# Patient Record
Sex: MALE | Race: WHITE | URBAN - METROPOLITAN AREA
[De-identification: names, ages, dates, MRNs, and addresses within clinical notes are randomized per-mention and may not be internally consistent; named-entity substitution may affect disease eponyms.]

---

## 2022-10-31 ENCOUNTER — OFFICE VISIT (OUTPATIENT)
Dept: URBAN - METROPOLITAN AREA CLINIC 7 | Facility: CLINIC | Age: 72
End: 2022-10-31
Payer: MEDICARE

## 2022-10-31 DIAGNOSIS — H17.9 CORNEAL SCAR: Primary | ICD-10-CM

## 2022-10-31 DIAGNOSIS — H35.81 RETINAL EDEMA: ICD-10-CM

## 2022-10-31 PROCEDURE — 99203 OFFICE O/P NEW LOW 30 MIN: CPT | Performed by: OPHTHALMOLOGY

## 2022-10-31 PROCEDURE — 92134 CPTRZ OPH DX IMG PST SGM RTA: CPT | Performed by: OPHTHALMOLOGY

## 2022-10-31 NOTE — IMPRESSION/PLAN
Impression: Retinal edema: H35.81. Plan: Currently on:
lumigan QHS OU
rhopressa QHS OU
brimonidine BID OU Trusopt BID OU
PF 3x a week OD Rec increase PF to TID OD - may increase IOP - d/w patient Will monitor with Damion Grady 1m OCT OU

## 2022-10-31 NOTE — IMPRESSION/PLAN
Impression: Corneal scar: H17.9.
s/p repeat PK OD 01/2022 OCT OD = IRF with  Plan: Has worsening vision View a bit hazy Check OCT Does have some IRF on OCT - please see edema diagnosis

## 2022-12-12 ENCOUNTER — OFFICE VISIT (OUTPATIENT)
Dept: URBAN - METROPOLITAN AREA CLINIC 7 | Facility: CLINIC | Age: 72
End: 2022-12-12
Payer: MEDICARE

## 2022-12-12 DIAGNOSIS — H17.9 CORNEAL SCAR: ICD-10-CM

## 2022-12-12 DIAGNOSIS — H35.81 RETINAL EDEMA: Primary | ICD-10-CM

## 2022-12-12 PROCEDURE — 99213 OFFICE O/P EST LOW 20 MIN: CPT | Performed by: OPHTHALMOLOGY

## 2022-12-12 PROCEDURE — 92134 CPTRZ OPH DX IMG PST SGM RTA: CPT | Performed by: OPHTHALMOLOGY

## 2022-12-12 RX ORDER — ACETAZOLAMIDE 250 MG/1
250 MG TABLET ORAL
Qty: 56 | Refills: 0 | Status: INACTIVE
Start: 2022-12-12 | End: 2022-12-25

## 2022-12-12 ASSESSMENT — INTRAOCULAR PRESSURE
OS: 4
OD: 38

## 2022-12-12 NOTE — IMPRESSION/PLAN
Impression: Retinal edema: H35.81. Plan: Currently on:
lumigan QHS OU
rhopressa QHS OU
brimonidine BID OU Trusopt BID OU Had been on PF 3x a week OD --> increased to TID for CME, but with IOP spike D/w patient in detail Rec taper PF to daily x 3 days then back to 3x a week Start acular BID - some anti-inflammatory component without the IOP drive - patient to consider Start diamox 500mg PO BID x 10 days Will monitor with Damion Grady 2 week IOP check
then 3m OCT OU

## 2022-12-12 NOTE — IMPRESSION/PLAN
Impression: Corneal scar: H17.9.
s/p repeat PK OD 01/2022 OCT OU - no view OU Plan: Please see above

## 2022-12-27 ENCOUNTER — OFFICE VISIT (OUTPATIENT)
Dept: URBAN - METROPOLITAN AREA CLINIC 13 | Facility: CLINIC | Age: 72
End: 2022-12-27
Payer: MEDICARE

## 2022-12-27 DIAGNOSIS — H35.81 RETINAL EDEMA: Primary | ICD-10-CM

## 2022-12-27 PROCEDURE — 99211 OFF/OP EST MAY X REQ PHY/QHP: CPT | Performed by: OPHTHALMOLOGY

## 2022-12-27 NOTE — IMPRESSION/PLAN
Impression: Retinal edema: H35.81. Plan: Patient is here for an IOP check. IOP is within normal limits. Patient is currently on;
lumigan QHS OU
rhopressa QHS OU
brimonidine BID OU Trusopt BID OU Acular BID Diamox 500mg PO BID x 10 days
and is currently tapering PF to daily x 3 days then back to 3x a week 2m OCT OU

## 2023-02-20 ENCOUNTER — OFFICE VISIT (OUTPATIENT)
Dept: URBAN - METROPOLITAN AREA CLINIC 7 | Facility: CLINIC | Age: 73
End: 2023-02-20
Payer: MEDICARE

## 2023-02-20 DIAGNOSIS — H17.9 CORNEAL SCAR: ICD-10-CM

## 2023-02-20 DIAGNOSIS — H35.81 RETINAL EDEMA: Primary | ICD-10-CM

## 2023-02-20 PROCEDURE — 99212 OFFICE O/P EST SF 10 MIN: CPT | Performed by: OPHTHALMOLOGY

## 2023-02-20 PROCEDURE — 92134 CPTRZ OPH DX IMG PST SGM RTA: CPT | Performed by: OPHTHALMOLOGY

## 2023-02-20 ASSESSMENT — INTRAOCULAR PRESSURE: OD: 28

## 2023-02-20 NOTE — IMPRESSION/PLAN
Impression: Retinal edema: H35.81. Plan: IOP borderline, but improved Had steroid response to his PF for his CME - now off Patient is currently on:
lumigan QHS OU
brimonidine BID OU Trusopt BID OU Acular BID 

has q3m f/u with cornea Froilan Lace) and glaucoma Hermina Freeze)

prn RCA